# Patient Record
Sex: MALE
[De-identification: names, ages, dates, MRNs, and addresses within clinical notes are randomized per-mention and may not be internally consistent; named-entity substitution may affect disease eponyms.]

---

## 2020-07-12 ENCOUNTER — HOSPITAL ENCOUNTER (EMERGENCY)
Dept: HOSPITAL 88 - ER | Age: 45
Discharge: HOME | End: 2020-07-12
Payer: SELF-PAY

## 2020-07-12 VITALS — WEIGHT: 200 LBS | HEIGHT: 67 IN | BODY MASS INDEX: 31.39 KG/M2

## 2020-07-12 DIAGNOSIS — I10: ICD-10-CM

## 2020-07-12 DIAGNOSIS — R11.0: ICD-10-CM

## 2020-07-12 DIAGNOSIS — R07.89: ICD-10-CM

## 2020-07-12 DIAGNOSIS — R10.11: Primary | ICD-10-CM

## 2020-07-12 DIAGNOSIS — M54.5: ICD-10-CM

## 2020-07-12 DIAGNOSIS — K82.9: ICD-10-CM

## 2020-07-12 LAB
ALBUMIN SERPL-MCNC: 3.9 G/DL (ref 3.5–5)
ALBUMIN/GLOB SERPL: 0.8 {RATIO} (ref 0.8–2)
ALP SERPL-CCNC: 79 IU/L (ref 40–150)
ALT SERPL-CCNC: 101 IU/L (ref 0–55)
ANION GAP SERPL CALC-SCNC: 16.7 MMOL/L (ref 8–16)
BACTERIA URNS QL MICRO: (no result) /HPF
BASOPHILS # BLD AUTO: 0.1 10*3/UL (ref 0–0.1)
BASOPHILS NFR BLD AUTO: 1.1 % (ref 0–1)
BILIRUB UR QL: NEGATIVE
BUN SERPL-MCNC: 6 MG/DL (ref 7–26)
BUN/CREAT SERPL: 7 (ref 6–25)
CALCIUM SERPL-MCNC: 9.3 MG/DL (ref 8.4–10.2)
CHLORIDE SERPL-SCNC: 100 MMOL/L (ref 98–107)
CK MB SERPL-MCNC: 2.8 NG/ML (ref 0–5)
CK MB SERPL-MCNC: 3.3 NG/ML (ref 0–5)
CK SERPL-CCNC: 445 IU/L (ref 30–200)
CK SERPL-CCNC: 540 IU/L (ref 30–200)
CLARITY UR: (no result)
CO2 SERPL-SCNC: 21 MMOL/L (ref 22–29)
COLOR UR: (no result)
DEPRECATED APTT PLAS QN: 29.2 SECONDS (ref 23.8–35.5)
DEPRECATED INR PLAS: 1.04
DEPRECATED NEUTROPHILS # BLD AUTO: 3.4 10*3/UL (ref 2.1–6.9)
DEPRECATED RBC URNS MANUAL-ACNC: (no result) /HPF (ref 0–5)
EGFRCR SERPLBLD CKD-EPI 2021: > 60 ML/MIN (ref 60–?)
EOSINOPHIL # BLD AUTO: 0.1 10*3/UL (ref 0–0.4)
EOSINOPHIL NFR BLD AUTO: 2.6 % (ref 0–6)
EPI CELLS URNS QL MICRO: (no result) /LPF
ERYTHROCYTE [DISTWIDTH] IN CORD BLOOD: 12.8 % (ref 11.7–14.4)
GLOBULIN PLAS-MCNC: 4.6 G/DL (ref 2.3–3.5)
GLUCOSE SERPLBLD-MCNC: 214 MG/DL (ref 74–118)
HCT VFR BLD AUTO: 48.4 % (ref 38.2–49.6)
HGB BLD-MCNC: 16.8 G/DL (ref 14–18)
KETONES UR QL STRIP.AUTO: NEGATIVE
LEUKOCYTE ESTERASE UR QL STRIP.AUTO: NEGATIVE
LYMPHOCYTES # BLD: 1.3 10*3/UL (ref 1–3.2)
LYMPHOCYTES NFR BLD AUTO: 22.9 % (ref 18–39.1)
MCH RBC QN AUTO: 32.1 PG (ref 28–32)
MCHC RBC AUTO-ENTMCNC: 34.7 G/DL (ref 31–35)
MCV RBC AUTO: 92.4 FL (ref 81–99)
MONOCYTES # BLD AUTO: 0.6 10*3/UL (ref 0.2–0.8)
MONOCYTES NFR BLD AUTO: 10.1 % (ref 4.4–11.3)
NEUTS SEG NFR BLD AUTO: 63.1 % (ref 38.7–80)
NITRITE UR QL STRIP.AUTO: NEGATIVE
PLATELET # BLD AUTO: 155 X10E3/UL (ref 140–360)
POTASSIUM SERPL-SCNC: 3.7 MMOL/L (ref 3.5–5.1)
PROT UR QL STRIP.AUTO: NEGATIVE
PROTHROMBIN TIME: 14.2 SECONDS (ref 11.9–14.5)
RBC # BLD AUTO: 5.24 X10E6/UL (ref 4.3–5.7)
SODIUM SERPL-SCNC: 134 MMOL/L (ref 136–145)
SP GR UR STRIP: 1.01 (ref 1.01–1.02)
UROBILINOGEN UR STRIP-MCNC: 0.2 MG/DL (ref 0.2–1)
WBC #/AREA URNS HPF: (no result) /HPF (ref 0–5)

## 2020-07-12 PROCEDURE — 83880 ASSAY OF NATRIURETIC PEPTIDE: CPT

## 2020-07-12 PROCEDURE — 36415 COLL VENOUS BLD VENIPUNCTURE: CPT

## 2020-07-12 PROCEDURE — 76705 ECHO EXAM OF ABDOMEN: CPT

## 2020-07-12 PROCEDURE — 85730 THROMBOPLASTIN TIME PARTIAL: CPT

## 2020-07-12 PROCEDURE — 80053 COMPREHEN METABOLIC PANEL: CPT

## 2020-07-12 PROCEDURE — 99284 EMERGENCY DEPT VISIT MOD MDM: CPT

## 2020-07-12 PROCEDURE — 82553 CREATINE MB FRACTION: CPT

## 2020-07-12 PROCEDURE — 85610 PROTHROMBIN TIME: CPT

## 2020-07-12 PROCEDURE — 85025 COMPLETE CBC W/AUTO DIFF WBC: CPT

## 2020-07-12 PROCEDURE — 84484 ASSAY OF TROPONIN QUANT: CPT

## 2020-07-12 PROCEDURE — 82550 ASSAY OF CK (CPK): CPT

## 2020-07-12 PROCEDURE — 81001 URINALYSIS AUTO W/SCOPE: CPT

## 2020-07-12 PROCEDURE — 71045 X-RAY EXAM CHEST 1 VIEW: CPT

## 2020-07-12 PROCEDURE — 93005 ELECTROCARDIOGRAM TRACING: CPT

## 2020-07-12 NOTE — DIAGNOSTIC IMAGING REPORT
Examination: Single AP view of the chest.



COMPARISON: None.



INDICATION: Low back pain, nausea

     

DISCUSSION:



Lines/tubes:  None.



Lungs:  The lungs are well inflated and clear. No pneumonia or pulmonary edema.



Pleura:  No pleural effusion or pneumothorax.



Heart and mediastinum:  The heart and the mediastinum are unremarkable.



Bones and soft tissues:  No acute bony abnormalities.     



IMPRESSION:

 

1.   No acute cardiopulmonary abnormalities.



Signed by: Dr. Andrew Palisch, M.D. on 7/12/2020 11:40 AM

## 2020-07-12 NOTE — DIAGNOSTIC IMAGING REPORT
EXAMINATION: Right upper quadrant ultrasound



CLINICAL INDICATION: Low back pain, nausea



COMPARISON: None



DISCUSSION:    Limited evaluation reported gas and body habitus.



Transverse and longitudinal images of the right upper quadrant were obtained.



The liver is normal in size measuring 14.8centimeters in length in the right

midclavicular line and shows increased echogenicity. No focal masses are seen

in the liver.  There is no intrahepatic biliary dilatation. 



The common bile duct and main portal vein are not well visualized. 



The gallbladder contains sludge. No calculi. No wall thickening or fluid. The

sonographic Kang's sign is negative. 

 

Pancreas poorly visualized.



The right kidney measures 10.2 centimeters in length. There is normal renal

cortical echogenicity and no hydronephrosis, mass or shadowing calculi. 



The visualized portions of the great vessels are normal. 



No free fluid is seen.



IMPRESSION:    



Gallbladder sludge. No cholelithiasis or cholecystitis.



Signed by: Dr. Andrew Palisch, M.D. on 7/12/2020 6:19 PM

## 2020-07-12 NOTE — EMERGENCY DEPARTMENT NOTE
History of Present Illnes


History of Present Illness


Chief Complaint:  Back Pain


History of Present Illness


This is a 45 year old  male C/O LOWER BACK PAIN X 4 DAYS STATES THE PAIN

PREVENTS HIM FROM SLEEPING ALSO C/O PRESSURE IN CHEST (POINTS TO SHARAN AREA) 

RADIATING TO BACK HX OF HTN TOOK LAST DOSE OF HTN MED LAST NIGHT DENIES SOB.


Historian:  Patient


Arrival Mode:  Car


 Required:  No


Onset (how long ago):  day(s) (4)


Location:  [facility]


Quality:  PAIN


Radiation:  Reports non-radiation


Severity:  moderate


Timing of current episode:  intermittent


Progression:  waxing and waning


Chronicity:  new


Context:  Denies recent illness


Relieving factors:  none


Exacerbating factors:  none


Associated symptoms:  Reports denies other symptoms


Treatments prior to arrival:  none





Past Medical/Family History


Physician Review


I have reviewed the patient's past medical and family history.  Any updates have

been documented here.





Past Medical History


Recent Fever:  No


Clinical Suspicion of Infectio:  No


New/Unexplained Change in Ment:  No


Past Medical History:  Hypertension


Other Surgery:  


FINGER SX





Social History


Smoking Cessation:  Never Smoker


Counseling Performed:  No


Alcohol Use:  None


Any Illegal Drug Use:  No


Physically hurt or threatened:  No





Other


Any Pre-Existing Lines (PICC,:  No





Review of Systems


Review of Systems


Constitutional:  Reports no symptoms


EENTM:  Reports no symptoms


Cardiovascular:  Reports as per HPI


Respiratory:  Reports no symptoms


Gastrointestinal:  Reports as per HPI


Genitourinary:  Reports no symptoms


Musculoskeletal:  Reports no symptoms


Integumentary:  Reports no symptoms


Neurological:  Reports no symptoms


Psychological:  Reports no symptoms


Endocrine:  Reports no symptoms


Hematological/Lymphatic:  Reports no symptoms





Physical Exam


Related Data


Allergies:  


Coded Allergies:  


     No Known Allergies (Unverified , 7/12/20)


Triage Vital Signs





Vital Signs








  Date Time  Temp Pulse Resp B/P (MAP) Pulse Ox O2 Delivery O2 Flow Rate FiO2


 


7/12/20 10:43 98.8 95 18 155/102 98 Room Air  








Vital signs reviewed:  Yes





Physical Exam


CONSTITUTIONAL





Constitutional:  Present well-developed, Present well-nourished


HENT


HENT:  Present normocephalic, Present atraumatic, Present oropharynx 

clear/moist, Present nose normal


HENT L/R:  Present left ext ear normal, Present right ext ear normal


EYES





Eyes:  Reports PERRL, Reports conjunctivae normal


NECK


Neck:  Present ROM normal


PULMONARY


Pulmonary:  Present effort normal, Present breath sounds normal


CARDIOVASCULAR





Cardiovascular:  Present regular rhythm, Present heart sounds normal, Present 

capillary refill normal, Present normal rate


GASTROINTESTINAL





Abdominal:  Present soft, Present bowel sounds normal, Present tender (MILD 

TENDERNESS SHARAN AREA WITHOUT R/G)


GENITOURINARY





Genitourinary:  Present exam deferred


SKIN


Skin:  Present warm, Present dry


MUSCULOSKELETAL





Musculoskeletal:  Present ROM normal


NEUROLOGICAL





Neurological:  Present alert, Present oriented x 3, Present no gross motor or 

sensory deficits


PSYCHOLOGICAL


Psychological:  Present mood/affect normal, Present judgement normal





Results


Laboratory


Result Diagram:  


7/12/20 1050                                                                    

           7/12/20 1050





Laboratory





Laboratory Tests








Test


 7/12/20


18:15 7/12/20


13:03 7/12/20


10:50


 


Creatine Kinase


 445 IU/L


() 


 540 IU/L


()


 


Creatine Kinase MB


 2.80 ng/mL


(0-5.0) 


 3.30 ng/mL


(0-5.0)


 


Troponin I


 < 0.001 ng/mL


(0-0.300) 


 < 0.001 ng/mL


(0-0.300)


 


Urine Color


 


 Orange


(YELLOW) 





 


Urine Clarity


 


 Sl cloudy


(CLEAR) 





 


Urine pH  7 (5 - 7)  


 


Urine Specific Gravity


 


 1.015


(1.010-1.025) 





 


Urine Protein


 


 Negative


(NEGATIVE) 





 


Urine Glucose (UA)


 


 Negative


(NEGATIVE) 





 


Urine Ketones


 


 Negative


(NEGATIVE) 





 


Urine Blood


 


 Trace


(NEGATIVE) 





 


Urine Nitrite


 


 Negative


(NEGATIVE) 





 


Urine Bilirubin


 


 Negative


(NEGATIVE) 





 


Urine Urobilinogen


 


 0.2 mg/dL (0.2


- 1) 





 


Urine Leukocyte Esterase


 


 Negative


(NEGATIVE) 





 


Urine RBC


 


 6-10 /HPF


(0-5) 





 


Urine WBC


 


 6-10 /HPF


(0-5) 





 


Urine Epithelial Cells


 


 Few /LPF


(NONE) 





 


Urine Bacteria


 


 Rare /HPF


(NONE) 





 


White Blood Count


 


 


 5.45 x10e3/uL


(4.8-10.8)


 


Red Blood Count


 


 


 5.24 x10e6/uL


(4.3-5.7)


 


Hemoglobin


 


 


 16.8 g/dL


(14.0-18.0)


 


Hematocrit


 


 


 48.4 %


(38.2-49.6)


 


Mean Corpuscular Volume


 


 


 92.4 fL


(81-99)


 


Mean Corpuscular Hemoglobin


 


 


 32.1 pg


(28-32)


 


Mean Corpuscular Hemoglobin


Concent 


 


 34.7 g/dL


(31-35)


 


Red Cell Distribution Width


 


 


 12.8 %


(11.7-14.4)


 


Platelet Count


 


 


 155 x10e3/uL


(140-360)


 


Neutrophils (%) (Auto)


 


 


 63.1 %


(38.7-80.0)


 


Lymphocytes (%) (Auto)


 


 


 22.9 %


(18.0-39.1)


 


Monocytes (%) (Auto)


 


 


 10.1  %


(4.4-11.3)


 


Eosinophils (%) (Auto)


 


 


 2.6 %


(0.0-6.0)


 


Basophils (%) (Auto)


 


 


 1.1 %


(0.0-1.0)


 


Neutrophils # (Auto)   3.4 (2.1-6.9) 


 


Lymphocytes # (Auto)   1.3 (1.0-3.2) 


 


Monocytes # (Auto)   0.6 (0.2-0.8) 


 


Eosinophils # (Auto)   0.1 (0.0-0.4) 


 


Basophils # (Auto)   0.1 (0.0-0.1) 


 


Absolute Immature Granulocyte


(auto 


 


 0.01 x10e3/uL


(0-0.1)


 


Prothrombin Time


 


 


 14.2 seconds


(11.9-14.5)


 


Prothromb Time International


Ratio 


 


 1.04 





 


Activated Partial


Thromboplast Time 


 


 29.2 seconds


(23.8-35.5)


 


Sodium Level


 


 


 134 mmol/L


(136-145)


 


Potassium Level


 


 


 3.7 mmol/L


(3.5-5.1)


 


Chloride Level


 


 


 100 mmol/L


()


 


Carbon Dioxide Level


 


 


 21 mmol/L


(22-29)


 


Anion Gap


 


 


 16.7 mmol/L


(8-16)


 


Blood Urea Nitrogen   6 mg/dL (7-26) 


 


Creatinine


 


 


 0.82 mg/dL


(0.72-1.25)


 


Estimat Glomerular Filtration


Rate 


 


 > 60 ML/MIN


(60-)


 


BUN/Creatinine Ratio   7 (6-25) 


 


Glucose Level


 


 


 214 mg/dL


()


 


Calcium Level


 


 


 9.3 mg/dL


(8.4-10.2)


 


Total Bilirubin


 


 


 1.9 mg/dL


(0.2-1.2)


 


Aspartate Amino Transf


(AST/SGOT) 


 


 141 IU/L


(5-34)


 


Alanine Aminotransferase


(ALT/SGPT) 


 


 101 IU/L


(0-55)


 


Alkaline Phosphatase


 


 


 79 IU/L


()


 


B-Type Natriuretic Peptide


 


 


 < 10.0 pg/mL


(0-100)


 


Total Protein


 


 


 8.5 g/dL


(6.5-8.1)


 


Albumin


 


 


 3.9 g/dL


(3.5-5.0)


 


Globulin


 


 


 4.6 g/dL


(2.3-3.5)


 


Albumin/Globulin Ratio   0.8 (0.8-2.0) 








Lab results reviewed:  Yes





Imaging


Imaging results reviewed:  Yes


Impressions





Examination: Single AP view of the chest.





COMPARISON: None.





INDICATION: Low back pain, nausea


     


DISCUSSION:





Lines/tubes:  None.





Lungs:  The lungs are well inflated and clear. No pneumonia or pulmonary edema.





Pleura:  No pleural effusion or pneumothorax.





Heart and mediastinum:  The heart and the mediastinum are unremarkable.





Bones and soft tissues:  No acute bony abnormalities.     





IMPRESSION:


 


1.   No acute cardiopulmonary abnormalities.





Signed by: Dr. Andrew Palisch, M.D. on 7/12/2020 11:40 AM








EXAMINATION: Right upper quadrant ultrasound





CLINICAL INDICATION: Low back pain, nausea





COMPARISON: None





DISCUSSION:    Limited evaluation reported gas and body habitus.





Transverse and longitudinal images of the right upper quadrant were obtained.





The liver is normal in size measuring 14.8centimeters in length in the right


midclavicular line and shows increased echogenicity. No focal masses are seen


in the liver.  There is no intrahepatic biliary dilatation. 





The common bile duct and main portal vein are not well visualized. 





The gallbladder contains sludge. No calculi. No wall thickening or fluid. The


sonographic Kang's sign is negative. 


 


Pancreas poorly visualized.





The right kidney measures 10.2 centimeters in length. There is normal renal


cortical echogenicity and no hydronephrosis, mass or shadowing calculi. 





The visualized portions of the great vessels are normal. 





No free fluid is seen.





IMPRESSION:    





Gallbladder sludge. No cholelithiasis or cholecystitis.





Signed by: Dr. Andrew Palisch, M.D. on 7/12/2020 6:19 PM





Procedures


12 Lead ECG Interpretation


ECG Interpretation :  


   ECG:  ECG 1


   :  Interpreted by ED physician


   Date:  Jul 12, 2020


   Time:  10:40


   Rhythm:  sinus rhythm


   Rate:  normal (94)


   QRS axis:  normal


   ST segments normal:  Yes


   T waves normal:  Yes


   Clinical Impression:  normal ECG





Assessment & Plan


Medical Decision Making


MDM


LABS, CXR, ABD U/S





Reassessment


Reassessment


DC HOME, [facility], [facility], F/U PCP, DR NICOLE ROBERTO





Assessment & Plan


Final Impression:  


(1) Abdominal pain


(2) Gallbladder sludge


Depart Disposition:  HOME, SELF-CARE


Last Vital Signs











  Date Time  Temp Pulse Resp B/P (MAP) Pulse Ox O2 Delivery O2 Flow Rate FiO2


 


7/12/20 16:58  88 18 130/87 96   


 


7/12/20 11:09 98.7       


 


7/12/20 10:43      Room Air  








Medications in the ED





Sodium Chloride 1,000 ml @  0 mls/hr Q0M STAT IV  Last administered on 7/12/20at

11:16; Admin Dose 999 MLS/HR;  Start 7/12/20 at 10:53;  Stop 7/12/20 at 10:56;  

Status DC


Aspirin 81 mg NOW  ONCE PO  Last administered on 7/12/20at 11:16; Admin Dose 81 

MG;  Start 7/12/20 at 11:15;  Stop 7/12/20 at 11:16;  Status DC


Metoprolol Tartrate 25 mg ONCE PO ;  Start 7/12/20 at 11:00;  Stop 7/12/20 at 

12:00;  Status DC











LEIDA PRAJAPATI MD               Jul 12, 2020 19:22

## 2020-10-19 ENCOUNTER — HOSPITAL ENCOUNTER (EMERGENCY)
Dept: HOSPITAL 88 - ER | Age: 45
Discharge: HOME | End: 2020-10-19
Payer: SELF-PAY

## 2020-10-19 VITALS — SYSTOLIC BLOOD PRESSURE: 159 MMHG | DIASTOLIC BLOOD PRESSURE: 96 MMHG

## 2020-10-19 VITALS — BODY MASS INDEX: 31.39 KG/M2 | WEIGHT: 200 LBS | HEIGHT: 67 IN

## 2020-10-19 DIAGNOSIS — R10.11: Primary | ICD-10-CM

## 2020-10-19 DIAGNOSIS — I10: ICD-10-CM

## 2020-10-19 DIAGNOSIS — R19.7: ICD-10-CM

## 2020-10-19 DIAGNOSIS — R11.2: ICD-10-CM

## 2020-10-19 DIAGNOSIS — K82.9: ICD-10-CM

## 2020-10-19 LAB
ALBUMIN SERPL-MCNC: 4.1 G/DL (ref 3.5–5)
ALBUMIN/GLOB SERPL: 1 {RATIO} (ref 0.8–2)
ALP SERPL-CCNC: 86 IU/L (ref 40–150)
ALT SERPL-CCNC: 88 IU/L (ref 0–55)
ANION GAP SERPL CALC-SCNC: 19.8 MMOL/L (ref 8–16)
BACTERIA URNS QL MICRO: (no result) /HPF
BASOPHILS # BLD AUTO: 0.1 10*3/UL (ref 0–0.1)
BASOPHILS NFR BLD AUTO: 1.4 % (ref 0–1)
BILIRUB UR QL: (no result)
BUN SERPL-MCNC: < 5 MG/DL (ref 7–26)
BUN/CREAT SERPL: 6 (ref 6–25)
CALCIUM SERPL-MCNC: 9 MG/DL (ref 8.4–10.2)
CHLORIDE SERPL-SCNC: 107 MMOL/L (ref 98–107)
CLARITY UR: (no result)
CO2 SERPL-SCNC: 19 MMOL/L (ref 22–29)
COLOR UR: (no result)
DEPRECATED NEUTROPHILS # BLD AUTO: 3.6 10*3/UL (ref 2.1–6.9)
DEPRECATED RBC URNS MANUAL-ACNC: (no result) /HPF (ref 0–5)
EGFRCR SERPLBLD CKD-EPI 2021: > 60 ML/MIN (ref 60–?)
EOSINOPHIL # BLD AUTO: 0.1 10*3/UL (ref 0–0.4)
EOSINOPHIL NFR BLD AUTO: 1 % (ref 0–6)
EPI CELLS URNS QL MICRO: (no result) /LPF
ERYTHROCYTE [DISTWIDTH] IN CORD BLOOD: 13.9 % (ref 11.7–14.4)
GLOBULIN PLAS-MCNC: 4.2 G/DL (ref 2.3–3.5)
GLUCOSE SERPLBLD-MCNC: 148 MG/DL (ref 74–118)
HCT VFR BLD AUTO: 47.6 % (ref 38.2–49.6)
HGB BLD-MCNC: 16.3 G/DL (ref 14–18)
KETONES UR QL STRIP.AUTO: (no result)
LEUKOCYTE ESTERASE UR QL STRIP.AUTO: NEGATIVE
LYMPHOCYTES # BLD: 1 10*3/UL (ref 1–3.2)
LYMPHOCYTES NFR BLD AUTO: 19 % (ref 18–39.1)
MCH RBC QN AUTO: 32 PG (ref 28–32)
MCHC RBC AUTO-ENTMCNC: 34.2 G/DL (ref 31–35)
MCV RBC AUTO: 93.5 FL (ref 81–99)
MONOCYTES # BLD AUTO: 0.3 10*3/UL (ref 0.2–0.8)
MONOCYTES NFR BLD AUTO: 6.7 % (ref 4.4–11.3)
MUCOUS THREADS URNS QL MICRO: (no result)
NEUTS SEG NFR BLD AUTO: 71.5 % (ref 38.7–80)
NITRITE UR QL STRIP.AUTO: NEGATIVE
PLATELET # BLD AUTO: 102 X10E3/UL (ref 140–360)
POTASSIUM SERPL-SCNC: 3.8 MMOL/L (ref 3.5–5.1)
PROT UR QL STRIP.AUTO: >=300
RBC # BLD AUTO: 5.09 X10E6/UL (ref 4.3–5.7)
SODIUM SERPL-SCNC: 142 MMOL/L (ref 136–145)
SP GR UR STRIP: 1.02 (ref 1.01–1.02)
UROBILINOGEN UR STRIP-MCNC: 0.2 MG/DL (ref 0.2–1)
WBC #/AREA URNS HPF: (no result) /HPF (ref 0–5)

## 2020-10-19 PROCEDURE — 81001 URINALYSIS AUTO W/SCOPE: CPT

## 2020-10-19 PROCEDURE — 93005 ELECTROCARDIOGRAM TRACING: CPT

## 2020-10-19 PROCEDURE — 76705 ECHO EXAM OF ABDOMEN: CPT

## 2020-10-19 PROCEDURE — 74177 CT ABD & PELVIS W/CONTRAST: CPT

## 2020-10-19 PROCEDURE — 80053 COMPREHEN METABOLIC PANEL: CPT

## 2020-10-19 PROCEDURE — 36415 COLL VENOUS BLD VENIPUNCTURE: CPT

## 2020-10-19 PROCEDURE — 85025 COMPLETE CBC W/AUTO DIFF WBC: CPT

## 2020-10-19 PROCEDURE — 99283 EMERGENCY DEPT VISIT LOW MDM: CPT
